# Patient Record
Sex: MALE | Race: WHITE | NOT HISPANIC OR LATINO | URBAN - NONMETROPOLITAN AREA
[De-identification: names, ages, dates, MRNs, and addresses within clinical notes are randomized per-mention and may not be internally consistent; named-entity substitution may affect disease eponyms.]

---

## 2024-01-09 ENCOUNTER — TELEPHONE (OUTPATIENT)
Dept: CARDIOLOGY | Facility: CLINIC | Age: 79
End: 2024-01-09
Payer: MEDICARE

## 2024-01-09 PROBLEM — I10 ESSENTIAL HYPERTENSION, BENIGN: Status: ACTIVE | Noted: 2024-01-09

## 2024-01-09 PROBLEM — Z91.199 NONCOMPLIANCE WITH TREATMENT: Status: ACTIVE | Noted: 2024-01-09

## 2024-01-09 PROBLEM — E11.9 DIABETES MELLITUS (MULTI): Status: ACTIVE | Noted: 2024-01-09

## 2024-01-09 PROBLEM — I25.10 ARTERIOSCLEROTIC CARDIOVASCULAR DISEASE (ASCVD): Status: ACTIVE | Noted: 2024-01-09

## 2024-01-09 PROBLEM — E78.5 HYPERLIPIDEMIA: Status: ACTIVE | Noted: 2024-01-09

## 2024-01-09 RX ORDER — MEMANTINE HYDROCHLORIDE 10 MG/1
10 TABLET ORAL 2 TIMES DAILY
COMMUNITY

## 2024-01-09 RX ORDER — DONEPEZIL HYDROCHLORIDE 10 MG/1
10 TABLET, FILM COATED ORAL NIGHTLY
COMMUNITY

## 2024-01-09 RX ORDER — AMLODIPINE BESYLATE 2.5 MG/1
5 TABLET ORAL DAILY
COMMUNITY
End: 2024-01-19

## 2024-01-09 RX ORDER — PIOGLITAZONEHYDROCHLORIDE 30 MG/1
30 TABLET ORAL DAILY
COMMUNITY

## 2024-01-09 RX ORDER — SERTRALINE HYDROCHLORIDE 25 MG/1
25 TABLET, FILM COATED ORAL DAILY
COMMUNITY

## 2024-01-09 RX ORDER — ROSUVASTATIN CALCIUM 40 MG/1
1 TABLET, COATED ORAL NIGHTLY
COMMUNITY

## 2024-01-09 RX ORDER — OLMESARTAN MEDOXOMIL AND HYDROCHLOROTHIAZIDE 40/25 40; 25 MG/1; MG/1
1 TABLET ORAL DAILY
COMMUNITY
End: 2024-01-09 | Stop reason: SINTOL

## 2024-01-09 NOTE — TELEPHONE ENCOUNTER
"Patient family member female caller phoned into office to advise, Patient has been severely fatigued. Patient is tired after sleeping and naps frequently through the day \"just has no energy\". Patient will be into the [place] office 01/19/2024, Patient family member wanted Dr. Winston Rodriguez MD aware of the symptoms so they can be addressed.   "

## 2024-01-19 ENCOUNTER — OFFICE VISIT (OUTPATIENT)
Dept: CARDIOLOGY | Facility: CLINIC | Age: 79
End: 2024-01-19
Payer: MEDICARE

## 2024-01-19 VITALS
SYSTOLIC BLOOD PRESSURE: 122 MMHG | HEIGHT: 71 IN | HEART RATE: 60 BPM | WEIGHT: 195 LBS | BODY MASS INDEX: 27.3 KG/M2 | DIASTOLIC BLOOD PRESSURE: 56 MMHG

## 2024-01-19 DIAGNOSIS — E78.5 HYPERLIPIDEMIA, UNSPECIFIED HYPERLIPIDEMIA TYPE: ICD-10-CM

## 2024-01-19 DIAGNOSIS — I25.10 ARTERIOSCLEROTIC CARDIOVASCULAR DISEASE (ASCVD): Primary | ICD-10-CM

## 2024-01-19 PROCEDURE — 1036F TOBACCO NON-USER: CPT | Performed by: INTERNAL MEDICINE

## 2024-01-19 PROCEDURE — 3074F SYST BP LT 130 MM HG: CPT | Performed by: INTERNAL MEDICINE

## 2024-01-19 PROCEDURE — 1159F MED LIST DOCD IN RCRD: CPT | Performed by: INTERNAL MEDICINE

## 2024-01-19 PROCEDURE — 3078F DIAST BP <80 MM HG: CPT | Performed by: INTERNAL MEDICINE

## 2024-01-19 PROCEDURE — 99213 OFFICE O/P EST LOW 20 MIN: CPT | Performed by: INTERNAL MEDICINE

## 2024-01-19 RX ORDER — MELATONIN 3 MG
1 CAPSULE ORAL NIGHTLY
COMMUNITY

## 2024-01-19 RX ORDER — INSULIN GLARGINE 100 [IU]/ML
10 INJECTION, SOLUTION SUBCUTANEOUS NIGHTLY
COMMUNITY

## 2024-01-19 ASSESSMENT — ENCOUNTER SYMPTOMS: DIZZINESS: 1

## 2024-01-19 NOTE — PATIENT INSTRUCTIONS
Please bring all medicines, vitamins, and herbal supplements with you when you come to the office.    Prescriptions will not be filled unless you are compliant with your follow up appointments or have a follow up appointment scheduled as per instruction of your physician. Refills should be requested at the time of your visit.

## 2024-01-19 NOTE — LETTER
"January 22, 2024     Silvio Martínez MD  [address] [place] [id number]    Patient: Mat Miranda   YOB: 1945   Date of Visit: 1/19/2024       Dear Dr. Silvio Martínez MD:    Thank you for referring Mat Miranda to me for evaluation. Below are my notes for this consultation.  If you have questions, please do not hesitate to call me. I look forward to following your patient along with you.       Sincerely,     Winston Rodriguez MD      CC: No Recipients  ______________________________________________________________________________________    Subjective   Mat Miranda is a 78 y.o. male       Chief Complaint    Follow-up          HPI     Patient returns in follow-up of problems as noted.  He is with his wife who facilitates history taking.  He is cooperative with the exam and congenial but has advancing dementia and his historical capabilities are poor.  Nonetheless he and his wife deny any notable shortness of breath with exertion or chest discomfort.  No lightheadedness syncope or near syncope.  Occasional dizziness as noted, but I believe this to be vestibular.  Medical therapy appears to be adequate and appropriate because of all the above we suggest no adjustments in therapy      Review of Systems   Neurological:  Positive for dizziness.   All other systems reviewed and are negative.         Visit Vitals  /56 (BP Location: Left arm, Patient Position: Sitting)   Pulse 60   Ht 1.803 m (5' 11\")   Wt 88.5 kg (195 lb)   BMI 27.20 kg/m²   Smoking Status Never   BSA 2.11 m²        Objective   Physical Exam  Constitutional:       Appearance: Normal appearance. He is normal weight.   HENT:      Nose: Nose normal.   Neck:      Vascular: No carotid bruit.   Cardiovascular:      Rate and Rhythm: Normal rate.      Pulses: Normal pulses.      Heart sounds: Normal heart sounds.   Pulmonary:      Effort: Pulmonary effort is normal.   Abdominal:      General: Bowel sounds are normal.      " Palpations: Abdomen is soft.   Genitourinary:     Rectum: Normal.   Musculoskeletal:         General: Normal range of motion.      Cervical back: Normal range of motion.      Right lower leg: No edema.      Left lower leg: No edema.   Skin:     General: Skin is warm and dry.   Neurological:      General: No focal deficit present.      Mental Status: He is alert.   Psychiatric:         Mood and Affect: Mood normal.         Behavior: Behavior normal.         Thought Content: Thought content normal.         Judgment: Judgment normal.         Current Medications    Current Outpatient Medications:   •  donepezil (Aricept) 10 mg tablet, Take 1 tablet (10 mg) by mouth once daily at bedtime., Disp: , Rfl:   •  insulin glargine (Basaglar KwikPen U-100 Insulin) 100 unit/mL (3 mL) pen, Inject 10 Units under the skin once daily at bedtime. Take as directed per insulin instructions., Disp: , Rfl:   •  insulin NPH and regular human (HumuLIN 70-30, NovoLIN 70-30) 100 unit/mL (70-30) injection, Inject under the skin 2 times a day before meals. Take as directed per insulin instructions., Disp: , Rfl:   •  melatonin 3 mg capsule, Take 1 capsule by mouth once daily at bedtime., Disp: , Rfl:   •  memantine (Namenda) 10 mg tablet, Take 1 tablet (10 mg) by mouth 2 times a day., Disp: , Rfl:   •  pioglitazone (Actos) 30 mg tablet, Take 1 tablet (30 mg) by mouth once daily., Disp: , Rfl:   •  rosuvastatin (Crestor) 40 mg tablet, Take 1 tablet (40 mg) by mouth once daily at bedtime., Disp: , Rfl:   •  sertraline (Zoloft) 25 mg tablet, Take 1 tablet (25 mg) by mouth once daily., Disp: , Rfl:                      Assessment/Plan     1. Arteriosclerotic cardiovascular disease (ASCVD)  Patient appears to be asymptomatic with no progression of disease suggested.  Continue same treatment.    2. Hyperlipidemia, unspecified hyperlipidemia type  Adequate control on current therapy.      Scribe Attestation  By signing my name below, Aleta SINGER LPN  , Scribe   attest that this documentation has been prepared under the direction and in the presence of Winston Rodriguez MD.

## 2024-01-19 NOTE — PROGRESS NOTES
"Subjective   Mat Miranda is a 78 y.o. male       Chief Complaint    Follow-up          HPI     Patient returns in follow-up of problems as noted.  He is with his wife who facilitates history taking.  He is cooperative with the exam and congenial but has advancing dementia and his historical capabilities are poor.  Nonetheless he and his wife deny any notable shortness of breath with exertion or chest discomfort.  No lightheadedness syncope or near syncope.  Occasional dizziness as noted, but I believe this to be vestibular.  Medical therapy appears to be adequate and appropriate because of all the above we suggest no adjustments in therapy      Review of Systems   Neurological:  Positive for dizziness.   All other systems reviewed and are negative.         Visit Vitals  /56 (BP Location: Left arm, Patient Position: Sitting)   Pulse 60   Ht 1.803 m (5' 11\")   Wt 88.5 kg (195 lb)   BMI 27.20 kg/m²   Smoking Status Never   BSA 2.11 m²        Objective   Physical Exam  Constitutional:       Appearance: Normal appearance. He is normal weight.   HENT:      Nose: Nose normal.   Neck:      Vascular: No carotid bruit.   Cardiovascular:      Rate and Rhythm: Normal rate.      Pulses: Normal pulses.      Heart sounds: Normal heart sounds.   Pulmonary:      Effort: Pulmonary effort is normal.   Abdominal:      General: Bowel sounds are normal.      Palpations: Abdomen is soft.   Genitourinary:     Rectum: Normal.   Musculoskeletal:         General: Normal range of motion.      Cervical back: Normal range of motion.      Right lower leg: No edema.      Left lower leg: No edema.   Skin:     General: Skin is warm and dry.   Neurological:      General: No focal deficit present.      Mental Status: He is alert.   Psychiatric:         Mood and Affect: Mood normal.         Behavior: Behavior normal.         Thought Content: Thought content normal.         Judgment: Judgment normal.         Current Medications    Current " Outpatient Medications:     donepezil (Aricept) 10 mg tablet, Take 1 tablet (10 mg) by mouth once daily at bedtime., Disp: , Rfl:     insulin glargine (Basaglar KwikPen U-100 Insulin) 100 unit/mL (3 mL) pen, Inject 10 Units under the skin once daily at bedtime. Take as directed per insulin instructions., Disp: , Rfl:     insulin NPH and regular human (HumuLIN 70-30, NovoLIN 70-30) 100 unit/mL (70-30) injection, Inject under the skin 2 times a day before meals. Take as directed per insulin instructions., Disp: , Rfl:     melatonin 3 mg capsule, Take 1 capsule by mouth once daily at bedtime., Disp: , Rfl:     memantine (Namenda) 10 mg tablet, Take 1 tablet (10 mg) by mouth 2 times a day., Disp: , Rfl:     pioglitazone (Actos) 30 mg tablet, Take 1 tablet (30 mg) by mouth once daily., Disp: , Rfl:     rosuvastatin (Crestor) 40 mg tablet, Take 1 tablet (40 mg) by mouth once daily at bedtime., Disp: , Rfl:     sertraline (Zoloft) 25 mg tablet, Take 1 tablet (25 mg) by mouth once daily., Disp: , Rfl:                      Assessment/Plan     1. Arteriosclerotic cardiovascular disease (ASCVD)  Patient appears to be asymptomatic with no progression of disease suggested.  Continue same treatment.    2. Hyperlipidemia, unspecified hyperlipidemia type  Adequate control on current therapy.      Scribe Attestation  By signing my name below, IAleta LPN , Scribe   attest that this documentation has been prepared under the direction and in the presence of Winston Rodriguez MD.

## 2024-06-18 DIAGNOSIS — E78.5 HYPERLIPIDEMIA, UNSPECIFIED: ICD-10-CM

## 2024-06-20 RX ORDER — HUMAN INSULIN 100 [IU]/ML
INJECTION, SUSPENSION SUBCUTANEOUS
COMMUNITY
Start: 2024-04-15

## 2024-06-20 RX ORDER — ROSUVASTATIN CALCIUM 40 MG/1
40 TABLET, COATED ORAL NIGHTLY
Qty: 90 TABLET | Refills: 3 | Status: SHIPPED | OUTPATIENT
Start: 2024-06-20

## 2024-10-15 ENCOUNTER — APPOINTMENT (OUTPATIENT)
Dept: CARDIOLOGY | Facility: CLINIC | Age: 79
End: 2024-10-15
Payer: MEDICARE

## 2024-10-22 ENCOUNTER — APPOINTMENT (OUTPATIENT)
Dept: CARDIOLOGY | Facility: CLINIC | Age: 79
End: 2024-10-22
Payer: MEDICARE

## 2025-01-13 ENCOUNTER — TELEPHONE (OUTPATIENT)
Dept: CARDIOLOGY | Facility: CLINIC | Age: 80
End: 2025-01-13
Payer: MEDICARE

## 2025-01-13 NOTE — TELEPHONE ENCOUNTER
Wife phoned that patient is complaining of right chest and arm pain. States he has advanced dementia so it is hard to figure out if there are any other symptoms. Did note that he is burping a lot. States she was taking him to the ER for evaluation. FYI.    To Lizette Jaquez NP

## 2025-01-16 NOTE — TELEPHONE ENCOUNTER
Patient's wife returned call. States they did go to the ED but were not seen due to the long wait. They called [facility] and stated they could not see him and would need to go to the ED. MAZIN.

## 2025-01-16 NOTE — TELEPHONE ENCOUNTER
Attempted to phone patient left detailed message on spouses VM, to phone office with recent hospital location to update chart. To [facility] for follow up.

## 2025-01-21 ENCOUNTER — APPOINTMENT (OUTPATIENT)
Dept: CARDIOLOGY | Facility: CLINIC | Age: 80
End: 2025-01-21
Payer: MEDICARE

## 2025-01-21 VITALS
WEIGHT: 161 LBS | DIASTOLIC BLOOD PRESSURE: 70 MMHG | HEART RATE: 64 BPM | HEIGHT: 71 IN | SYSTOLIC BLOOD PRESSURE: 120 MMHG | BODY MASS INDEX: 22.54 KG/M2

## 2025-01-21 DIAGNOSIS — E78.2 MIXED HYPERLIPIDEMIA: Primary | ICD-10-CM

## 2025-01-21 DIAGNOSIS — Z79.4 TYPE 2 DIABETES MELLITUS WITHOUT COMPLICATION, WITH LONG-TERM CURRENT USE OF INSULIN (MULTI): ICD-10-CM

## 2025-01-21 DIAGNOSIS — F03.B0 MODERATE DEMENTIA, UNSPECIFIED DEMENTIA TYPE, UNSPECIFIED WHETHER BEHAVIORAL, PSYCHOTIC, OR MOOD DISTURBANCE OR ANXIETY: ICD-10-CM

## 2025-01-21 DIAGNOSIS — I25.10 ARTERIOSCLEROTIC CARDIOVASCULAR DISEASE (ASCVD): ICD-10-CM

## 2025-01-21 DIAGNOSIS — E11.9 TYPE 2 DIABETES MELLITUS WITHOUT COMPLICATION, WITH LONG-TERM CURRENT USE OF INSULIN (MULTI): ICD-10-CM

## 2025-01-21 PROBLEM — F03.90 DEMENTIA: Status: ACTIVE | Noted: 2025-01-21

## 2025-01-21 PROCEDURE — 1036F TOBACCO NON-USER: CPT | Performed by: NURSE PRACTITIONER

## 2025-01-21 PROCEDURE — 99212 OFFICE O/P EST SF 10 MIN: CPT | Performed by: NURSE PRACTITIONER

## 2025-01-21 PROCEDURE — 1159F MED LIST DOCD IN RCRD: CPT | Performed by: NURSE PRACTITIONER

## 2025-01-21 PROCEDURE — 3078F DIAST BP <80 MM HG: CPT | Performed by: NURSE PRACTITIONER

## 2025-01-21 PROCEDURE — 3074F SYST BP LT 130 MM HG: CPT | Performed by: NURSE PRACTITIONER

## 2025-01-21 PROCEDURE — 1160F RVW MEDS BY RX/DR IN RCRD: CPT | Performed by: NURSE PRACTITIONER

## 2025-01-21 RX ORDER — TRAZODONE HYDROCHLORIDE 50 MG/1
25 TABLET ORAL NIGHTLY
COMMUNITY
Start: 2025-01-14

## 2025-01-21 ASSESSMENT — ENCOUNTER SYMPTOMS
DYSPNEA ON EXERTION: 0
PALPITATIONS: 0
IRREGULAR HEARTBEAT: 0
PND: 0
NEAR-SYNCOPE: 0
SYNCOPE: 0
ORTHOPNEA: 0

## 2025-01-21 NOTE — PROGRESS NOTES
"Chief Complaint  Wife reports she has noticed a difference in his overall behavior.    Reason for Visit  Annual follow-up  Patient presents to the office today for outpatient follow-up for moderate coronary artery disease.  Last evaluated in clinic by Dr. Jarquin January 2024.    Presents today ambulatory.  Accompanied by spouse  Patient denies any hospitalizations or significant changes to interval medical history since last office follow-up.   He follows routinely with PCP, endocrine and neurology.    History of Present Illness   Patient is a very pleasant 79-year-old gentleman with a history of advanced Alzheimer's dementia, he is unable to provide any type of meaningful conversation.  Information was gathered from his wife.  She reports that he has had progressive worsening behavioral disturbance with increased agitation; medications have recently been adjusted.  She is concerned that sometimes he is clutching his chest and other times complaining of his leg aching.  Here in the office today he is in no acute distress, unable to provide any type of meaningful history.    Reviewed prior cardiovascular testing including 2005 cardiac cath showing moderate coronary artery disease, LVEF 60% and a June 2023 Holter showing sinus bradycardia without evidence of high-grade heart block.  Due to advanced dementia, we will proceed with conservative management.  He does not exhibit any symptoms that are concerning for decompensated heart failure, rhythm is regular ausculatory rate in the office.    They do follow closely with PCP and will defer additional primary prevention.    Review of Systems   Unable to perform ROS: Dementia   Cardiovascular:  Negative for chest pain, dyspnea on exertion, irregular heartbeat, leg swelling, near-syncope, orthopnea, palpitations, paroxysmal nocturnal dyspnea and syncope.        Visit Vitals  /70 (BP Location: Left arm, Patient Position: Sitting)   Pulse 64   Ht 1.803 m (5' 11\")   Wt " 73 kg (161 lb)   BMI 22.45 kg/m²   Smoking Status Never   BSA 1.91 m²     Physical Exam  Vitals and nursing note reviewed.   Constitutional:       Appearance: Normal appearance.   Cardiovascular:      Rate and Rhythm: Normal rate and regular rhythm.      Heart sounds: Normal heart sounds.   Pulmonary:      Effort: Pulmonary effort is normal.      Breath sounds: Normal breath sounds.   Musculoskeletal:      Cervical back: Full passive range of motion without pain.      Right lower leg: No edema.      Left lower leg: No edema.   Skin:     General: Skin is cool.   Neurological:      Mental Status: He is alert and oriented to person, place, and time.   Psychiatric:         Attention and Perception: He is inattentive.         Mood and Affect: Mood normal.         Behavior: Behavior is cooperative.         Cognition and Memory: Cognition is impaired.      No Known Allergies    Current Outpatient Medications   Medication Instructions    donepezil (ARICEPT) 10 mg, Nightly    memantine (NAMENDA) 10 mg, 2 times daily    NovoLIN 70-30 FlexPen U-100 100 unit/mL (70-30) injection INJECT 13 UNITS SUBCUTANEOUSLY BEFORE BREAKFAST, 8 UNITS BEFORE LUNCH, 12 UNITS BEFORE SUPPER AS DIRECTED. TITRATE UP TO 40 UNITS PER DAY    rosuvastatin (CRESTOR) 40 mg, oral, Nightly    traZODone (DESYREL) 25 mg, Nightly      Assessment:    A 79-year-old gentleman with advanced Alzheimer's dementia presents to the office unable to provide any meaningful history.  From a cardiovascular standpoint 2005 cardiac cath with moderate coronary artery disease, prior normal LVEF.  He currently exhibits no symptoms concerning for decompensated heart failure, ambulated in from the parking lot and appears to be comfortable while sitting in the office.  Overall, appears to be stable from a cardiovascular standpoint and will defer additional primary prevention to PCP.    Plan:     Through informed decision making process incorporating patients unique circumstances,  the following treatment plan will be initiated:    1.  Prescription drug management of cardiovascular medication for efficacy, adherence to treatment, side effect assessment and polypharmacy. Current treatment clinically warranted and to continue without modifications.    2. Return for follow-up; in the interim, contact the office if new symptoms arise.  Cardiology as needed     Lizette Mcfarland MSN, APRN-CNP, PMHNP-BC  [facility] [place]  [place], [place]     Please excuse any errors in grammar or translation related to this dictation. Voice recognition software was utilized to prepare this document.

## 2025-01-21 NOTE — LETTER
January 21, 2025     Silvio Martínez MD  [address] [place] [id number]    Patient: Mat Miranda   YOB: 1945   Date of Visit: 1/21/2025       Dear Dr. Silvio Martínez MD:    Thank you for referring Mat Miranda to me for evaluation. Below are my notes for this consultation.  If you have questions, please do not hesitate to call me. I look forward to following your patient along with you.       Sincerely,     Lizette Mcfarland, APRN-CNP      CC: No Recipients  ______________________________________________________________________________________    Chief Complaint  Wife reports she has noticed a difference in his overall behavior.    Reason for Visit  Annual follow-up  Patient presents to the office today for outpatient follow-up for moderate coronary artery disease.  Last evaluated in clinic by Dr. Jarquin January 2024.    Presents today ambulatory.  Accompanied by spouse  Patient denies any hospitalizations or significant changes to interval medical history since last office follow-up.   He follows routinely with PCP, endocrine and neurology.    History of Present Illness   Patient is a very pleasant 79-year-old gentleman with a history of advanced Alzheimer's dementia, he is unable to provide any type of meaningful conversation.  Information was gathered from his wife.  She reports that he has had progressive worsening behavioral disturbance with increased agitation; medications have recently been adjusted.  She is concerned that sometimes he is clutching his chest and other times complaining of his leg aching.  Here in the office today he is in no acute distress, unable to provide any type of meaningful history.    Reviewed prior cardiovascular testing including 2005 cardiac cath showing moderate coronary artery disease, LVEF 60% and a June 2023 Holter showing sinus bradycardia without evidence of high-grade heart block.  Due to advanced dementia, we will proceed with conservative  "management.  He does not exhibit any symptoms that are concerning for decompensated heart failure, rhythm is regular ausculatory rate in the office.    They do follow closely with PCP and will defer additional primary prevention.    Review of Systems   Unable to perform ROS: Dementia   Cardiovascular:  Negative for chest pain, dyspnea on exertion, irregular heartbeat, leg swelling, near-syncope, orthopnea, palpitations, paroxysmal nocturnal dyspnea and syncope.        Visit Vitals  /70 (BP Location: Left arm, Patient Position: Sitting)   Pulse 64   Ht 1.803 m (5' 11\")   Wt 73 kg (161 lb)   BMI 22.45 kg/m²   Smoking Status Never   BSA 1.91 m²     Physical Exam  Vitals and nursing note reviewed.   Constitutional:       Appearance: Normal appearance.   Cardiovascular:      Rate and Rhythm: Normal rate and regular rhythm.      Heart sounds: Normal heart sounds.   Pulmonary:      Effort: Pulmonary effort is normal.      Breath sounds: Normal breath sounds.   Musculoskeletal:      Cervical back: Full passive range of motion without pain.      Right lower leg: No edema.      Left lower leg: No edema.   Skin:     General: Skin is cool.   Neurological:      Mental Status: He is alert and oriented to person, place, and time.   Psychiatric:         Attention and Perception: He is inattentive.         Mood and Affect: Mood normal.         Behavior: Behavior is cooperative.         Cognition and Memory: Cognition is impaired.      No Known Allergies    Current Outpatient Medications   Medication Instructions   • donepezil (ARICEPT) 10 mg, Nightly   • memantine (NAMENDA) 10 mg, 2 times daily   • NovoLIN 70-30 FlexPen U-100 100 unit/mL (70-30) injection INJECT 13 UNITS SUBCUTANEOUSLY BEFORE BREAKFAST, 8 UNITS BEFORE LUNCH, 12 UNITS BEFORE SUPPER AS DIRECTED. TITRATE UP TO 40 UNITS PER DAY   • rosuvastatin (CRESTOR) 40 mg, oral, Nightly   • traZODone (DESYREL) 25 mg, Nightly      Assessment:    A 79-year-old gentleman with " advanced Alzheimer's dementia presents to the office unable to provide any meaningful history.  From a cardiovascular standpoint 2005 cardiac cath with moderate coronary artery disease, prior normal LVEF.  He currently exhibits no symptoms concerning for decompensated heart failure, ambulated in from the parking lot and appears to be comfortable while sitting in the office.  Overall, appears to be stable from a cardiovascular standpoint and will defer additional primary prevention to PCP.    Plan:     Through informed decision making process incorporating patients unique circumstances, the following treatment plan will be initiated:    1.  Prescription drug management of cardiovascular medication for efficacy, adherence to treatment, side effect assessment and polypharmacy. Current treatment clinically warranted and to continue without modifications.    2. Return for follow-up; in the interim, contact the office if new symptoms arise.  Cardiology as needed     Lizette Mcfarland MSN, APRN-CNP, PMHNP-[facility] [place]  [place], [place]     Please excuse any errors in grammar or translation related to this dictation. Voice recognition software was utilized to prepare this document.

## 2025-01-21 NOTE — PATIENT INSTRUCTIONS
Please bring all medicines, vitamins, and herbal supplements with you when you come to the office.    Prescriptions will not be filled unless you are compliant with your follow up appointments or have a follow up appointment scheduled as per instruction of your physician. Refills should be requested at the time of your visit.     PLAN:   Through informed decision making process incorporating patients unique circumstances, the following treatment plan will be initiated:    1.  Prescription drug management of cardiovascular medication for efficacy, adherence to treatment, side effect assessment and polypharmacy. Current treatment clinically warranted and to continue without modifications.    2. Return for follow-up; in the interim, contact the office if new symptoms arise.  Cardiology as needed

## 2025-06-04 ENCOUNTER — HOSPITAL ENCOUNTER (OUTPATIENT)
Age: 80
Setting detail: SPECIMEN
Discharge: HOME | End: 2025-06-04

## 2025-06-04 LAB
CHOLEST SERPL-MCNC: 150 MG/DL (ref 0–199)
CHOLESTEROL/HDL RATIO: 2.8
HDLC SERPL-MCNC: 54 MG/DL
LDLC SERPL CALC-MCNC: 85 MG/DL (ref 0–100)
TRIGL SERPL-MCNC: 53 MG/DL
VLDLC SERPL CALC-MCNC: 11 MG/DL (ref 1–30)

## 2025-06-04 PROCEDURE — 80061 LIPID PANEL: CPT

## 2025-06-04 PROCEDURE — 36415 COLL VENOUS BLD VENIPUNCTURE: CPT

## 2025-06-04 PROCEDURE — P9604 ONE-WAY ALLOW PRORATED TRIP: HCPCS
